# Patient Record
Sex: MALE | Race: WHITE
[De-identification: names, ages, dates, MRNs, and addresses within clinical notes are randomized per-mention and may not be internally consistent; named-entity substitution may affect disease eponyms.]

---

## 2017-01-27 ENCOUNTER — HOSPITAL ENCOUNTER (OUTPATIENT)
Dept: HOSPITAL 75 - RAD | Age: 37
End: 2017-01-27
Attending: NURSE PRACTITIONER
Payer: COMMERCIAL

## 2017-01-27 DIAGNOSIS — R05: Primary | ICD-10-CM

## 2017-01-27 PROCEDURE — 71020: CPT

## 2017-01-27 NOTE — DIAGNOSTIC IMAGING REPORT
PA and lateral chest.



INDICATION: Congestion.



There are no prior studies available for comparison.



FINDINGS: The heart size is within normal limits. There are a

few crowded bronchovascular markings in the right infrahilar

region but there is no evidence for pneumonia or for pleural

effusion. The mediastinum is not widened. The osseous structures

are intact.



IMPRESSION: There is no evidence for an acute cardiopulmonary

abnormality.



Dictated by:



Dictated on workstation # [id number]

## 2017-04-17 ENCOUNTER — HOSPITAL ENCOUNTER (OUTPATIENT)
Dept: HOSPITAL 75 - RAD | Age: 37
End: 2017-04-17
Attending: FAMILY MEDICINE
Payer: COMMERCIAL

## 2017-04-17 DIAGNOSIS — N50.89: Primary | ICD-10-CM

## 2017-04-17 PROCEDURE — 76870 US EXAM SCROTUM: CPT

## 2017-04-17 NOTE — DIAGNOSTIC IMAGING REPORT
INDICATION: Left inguinal hernia, irregular shape of the right

testicle.



COMPARISON: None.



DISCUSSION: Sonographic evaluation of the scrotum was performed.

The testicles appear normal and symmetric in echotexture and

size bilaterally with normal color and spectral Doppler blood

flow. The right testicle measures 4.8 x 2.6 x 3.4 cm. The left

testicle measures 4.9 x 2.8 x 3.1 cm. Right epididymal cyst

measures 1.8 cm and is benign though could account for palpable

abnormality. No hydrocele or varicocele. The epididymides are

unremarkable otherwise. Fat-containing left inguinal hernia is

incidentally noted.



IMPRESSION:

1. A 1.8 cm right epididymal cyst is benign though could account

for palpable abnormality.

2. Fat-containing left inguinal hernia.



Dictated by:



Dictated on workstation # [id number]

## 2017-05-30 ENCOUNTER — HOSPITAL ENCOUNTER (OUTPATIENT)
Dept: HOSPITAL 75 - PREOP | Age: 37
End: 2017-05-30
Attending: SURGERY
Payer: COMMERCIAL

## 2017-05-30 VITALS — WEIGHT: 295 LBS | HEIGHT: 71 IN | BODY MASS INDEX: 41.3 KG/M2

## 2017-05-30 DIAGNOSIS — Z01.818: Primary | ICD-10-CM

## 2017-05-30 DIAGNOSIS — K40.90: ICD-10-CM

## 2017-06-01 ENCOUNTER — HOSPITAL ENCOUNTER (OUTPATIENT)
Dept: HOSPITAL 75 - SDC | Age: 37
Discharge: HOME | End: 2017-06-01
Attending: SURGERY
Payer: COMMERCIAL

## 2017-06-01 VITALS — DIASTOLIC BLOOD PRESSURE: 85 MMHG | SYSTOLIC BLOOD PRESSURE: 141 MMHG

## 2017-06-01 VITALS — BODY MASS INDEX: 41.3 KG/M2 | HEIGHT: 71 IN | WEIGHT: 295 LBS

## 2017-06-01 VITALS — SYSTOLIC BLOOD PRESSURE: 144 MMHG | DIASTOLIC BLOOD PRESSURE: 94 MMHG

## 2017-06-01 VITALS — SYSTOLIC BLOOD PRESSURE: 133 MMHG | DIASTOLIC BLOOD PRESSURE: 83 MMHG

## 2017-06-01 VITALS — SYSTOLIC BLOOD PRESSURE: 132 MMHG | DIASTOLIC BLOOD PRESSURE: 90 MMHG

## 2017-06-01 VITALS — SYSTOLIC BLOOD PRESSURE: 141 MMHG | DIASTOLIC BLOOD PRESSURE: 85 MMHG

## 2017-06-01 DIAGNOSIS — I10: ICD-10-CM

## 2017-06-01 DIAGNOSIS — D17.6: ICD-10-CM

## 2017-06-01 DIAGNOSIS — K21.9: ICD-10-CM

## 2017-06-01 DIAGNOSIS — Z79.899: ICD-10-CM

## 2017-06-01 DIAGNOSIS — Q61.3: ICD-10-CM

## 2017-06-01 DIAGNOSIS — K40.90: Primary | ICD-10-CM

## 2017-06-01 LAB
ANION GAP SERPL CALC-SCNC: 12 MMOL/L (ref 5–14)
BUN SERPL-MCNC: 30 MG/DL (ref 7–18)
BUN/CREAT SERPL: 20
CALCIUM SERPL-MCNC: 8.9 MG/DL (ref 8.5–10.1)
CHLORIDE SERPL-SCNC: 106 MMOL/L (ref 98–107)
CO2 SERPL-SCNC: 23 MMOL/L (ref 21–32)
CREAT SERPL-MCNC: 1.52 MG/DL (ref 0.6–1.3)
ERYTHROCYTE [DISTWIDTH] IN BLOOD BY AUTOMATED COUNT: 14.2 % (ref 10–14.5)
GFR SERPLBLD BASED ON 1.73 SQ M-ARVRAT: 51 ML/MIN
GLUCOSE SERPL-MCNC: 102 MG/DL (ref 70–105)
MCH RBC QN AUTO: 30 PG (ref 25–34)
MCHC RBC AUTO-ENTMCNC: 35 G/DL (ref 32–36)
MCV RBC AUTO: 87 FL (ref 80–99)
PLATELET # BLD: 192 10^3/UL (ref 130–400)
PMV BLD AUTO: 11.2 FL (ref 7.4–10.4)
POTASSIUM SERPL-SCNC: 3.8 MMOL/L (ref 3.6–5)
RBC # BLD AUTO: 5.24 10^6/UL (ref 4.35–5.85)
SODIUM SERPL-SCNC: 141 MMOL/L (ref 135–145)
WBC # BLD AUTO: 9.2 10^3/UL (ref 4.3–11)

## 2017-06-01 PROCEDURE — 94664 DEMO&/EVAL PT USE INHALER: CPT

## 2017-06-01 PROCEDURE — 85027 COMPLETE CBC AUTOMATED: CPT

## 2017-06-01 PROCEDURE — 80048 BASIC METABOLIC PNL TOTAL CA: CPT

## 2017-06-01 PROCEDURE — 87081 CULTURE SCREEN ONLY: CPT

## 2017-06-01 PROCEDURE — 36415 COLL VENOUS BLD VENIPUNCTURE: CPT

## 2017-06-01 RX ADMIN — SODIUM CHLORIDE, SODIUM LACTATE, POTASSIUM CHLORIDE, AND CALCIUM CHLORIDE PRN MLS/HR: 600; 310; 30; 20 INJECTION, SOLUTION INTRAVENOUS at 07:50

## 2017-06-01 RX ADMIN — SODIUM CHLORIDE, SODIUM LACTATE, POTASSIUM CHLORIDE, AND CALCIUM CHLORIDE PRN MLS/HR: 600; 310; 30; 20 INJECTION, SOLUTION INTRAVENOUS at 12:15

## 2017-06-01 RX ADMIN — MORPHINE SULFATE PRN MG: 10 INJECTION, SOLUTION INTRAMUSCULAR; INTRAVENOUS at 13:07

## 2017-06-01 RX ADMIN — MORPHINE SULFATE PRN MG: 10 INJECTION, SOLUTION INTRAMUSCULAR; INTRAVENOUS at 13:00

## 2017-06-01 RX ADMIN — MORPHINE SULFATE PRN MG: 10 INJECTION, SOLUTION INTRAMUSCULAR; INTRAVENOUS at 13:20

## 2017-06-01 NOTE — PROGRESS NOTE-POST OPERATIVE
Post-Operative Progess Note


Surgeon (s)/Assistant (s)


Surgeon


ANISA HAYS MD


Assistant:  bebeto hsu APRN





Pre-Operative Diagnosis


symptomatic left inguinal hernia





Post-Operative Diagnosis





left indirect ing hernia, cord lipoma





Procedure & Operative Findings


Date of Procedure


6/1/17


Procedure Performed/Findings


laparoscopic left inguinal hernia repair with mesh.


Anesthesia Type


GET





Estimated Blood Loss


Estimated blood loss (mL):  minimal





Specimens/Packing


Specimens Removed


none











ANISA HAYS MD Jun 1, 2017 12:36 pm

## 2017-06-01 NOTE — PROGRESS NOTE-PRE OPERATIVE
Pre-Operative Progress Note


H&P Reviewed


The H&P was reviewed, patient examined and no changes noted.


Date H&P Reviewed:  Jun 1, 2017


Time H&P Reviewed:  07:45


Pre-Operative Diagnosis:  symptomatic left inguinal hernia











KATHLEEN ROMAN Jun 1, 2017 8:12 am

## 2017-06-01 NOTE — OPERATIVE REPORT
DATE OF SERVICE:  06/01/2017



PREOPERATIVE DIAGNOSIS:

Symptomatic left inguinal hernia.



POSTOPERATIVE DIAGNOSIS:

Symptomatic left indirect inguinal hernia.



PROCEDURE:

Laparoscopic left inguinal hernia repair with mesh.



SURGEON:

Anisa Hays MD 



ASSISTANT:

ELIZABETH Guillen 



ANESTHESIA:

General endotracheal.



ESTIMATED BLOOD LOSS:

Minimal.



FINDINGS:

Small indirect inguinal hernia as well as a cord lipoma.  There was no right

inguinal hernia component.



DISPOSITION:

The patient tolerated the procedure well.



INDICATIONS:

The patient is a 37-year-old male who has noticed a bulge in the left inguinal

region.  He reports that he first saw this 6 months ago and it has gradually

become larger in size.  He states that with rest and lying down that he can

reduce this back in.  Upon examination, he was found to have a reducible left

inguinal hernia which was tender to palpation.



DESCRIPTION OF PROCEDURE:

The patient was brought to the operating room, laid supine on the table.  After

adequate IV pain and sedative medications and general endotracheal intubation,

the abdomen and perineum were prepped and draped in standard surgical fashion.



Marcaine 0.5% with epinephrine was then used to anesthetize the overlying skin

in the infraumbilical rim and a small transverse skin incision made using a 15

blade.  The abdominal wall was then retracted anteriorly with a sharp towel

clamp and a Veress needle inserted with a low opening pressure of 0 mmHg and the

abdomen was insufflated to 15 mmHg pressure.  The Veress needle removed and a 5

mm Xcel trocar placed followed by a 5 mm 45 degree angle laparoscope,

visualizing the peritoneal cavity.



A 4-quadrant abdominal exploration was performed.  There was a small left

indirect inguinal hernia identified.  There was, upon further exploration, a

left cord lipoma as well.  There was no right inguinal hernia component.  Under

direct visualization, we then proceeded to place bilateral 5 mm ports after the

skin and peritoneum were anesthetized using 0.5% Marcaine with epinephrine and a

skin incision made using a 15 blade.



The patient was then placed in Trendelenburg position.  The peritoneal lining

was then opened starting laterally using a Sonicision towards the conjoint

tendon and inguinal ligament laterally.  We then proceeded medially toward

Vincent's ligament.  We then proceeded with inferior dissection encompassing the

hernia sac as well as the entire cord lipoma.  The cord and its adjacent

structures were identified and spared throughout the process.  Good hemostasis

was observed.  A medium sized Bard polypropylene mesh was then placed through

the 10 mm port site.  The mesh was then tacked to Vincent's ligament medially

with a secure strap and then laterally to the conjoint tendon and inguinal

ligament.  The omentum was then placed entirely back over the mesh and a few

tacks placed to hold this into place with visualization and good hemostasis.



The 10 mm port site fascia and peritoneum were then closed under direct

visualization using a Timothy-Marilyn device and 0 Vicryl suture.  The abdomen

was desufflated and the remaining ports removed.  All skin incisions were closed

using 4-0 Monocryl running subcuticular sutures.  Wounds were then cleaned and

covered with Dermabond.



The patient tolerated the procedure well.  We will start IV and oral pain

medication as well as a clear liquid diet.  Once he is tolerating clears, has

good pain control with oral pain medications and ambulating well, we will

discharge him home.  He will be instructed to wear scrotal support at all times 

as well as do no heavy lifting or exertion for the next 2 weeks and then

increase activity; however, continue to refrain from heavy lifting and exertion

until 6 weeks from the surgery date.





Job ID: [id number]

DocumentID: [id number]

Dictated Date:  06/01/2017 12:35:47

Transcription Date: 06/01/2017 13:29:06

Dictated By: ANISA HAYS MD

## 2017-06-01 NOTE — DISCHARGE INST-SURGICAL
D/C Lap Instructions-LIS


New, Converted, or Re-Newed RX:  RX on Chart


Follow Up Appt in 2 weeks





Activity as tolerated


No driving for 24 hours


No driving while on pain medications





Incentive Spirometry use every 2 hours while awake





Regular Diet





Symptoms to Report: Fever over 101 degree F, Nausea/Vomiting 


Infection Signs and Symptoms to report:  Increased redness, Foul odor of wound, 

Increased drainage





Bathing instructions: May shower


Operative Area Clean/Dry;  Keep incision clean/dry





If any problems/questions: Contact your physician or go to Emergency Room











ANISA HAYS MD Jun 1, 2017 12:39 pm

## 2020-05-06 ENCOUNTER — HOSPITAL ENCOUNTER (OUTPATIENT)
Dept: HOSPITAL 75 - RAD | Age: 40
End: 2020-05-06
Attending: INTERNAL MEDICINE
Payer: COMMERCIAL

## 2020-05-06 DIAGNOSIS — Q61.2: ICD-10-CM

## 2020-05-06 DIAGNOSIS — R31.29: ICD-10-CM

## 2020-05-06 DIAGNOSIS — E55.9: ICD-10-CM

## 2020-05-06 DIAGNOSIS — E79.0: ICD-10-CM

## 2020-05-06 DIAGNOSIS — I12.9: Primary | ICD-10-CM

## 2020-05-06 DIAGNOSIS — N18.3: ICD-10-CM

## 2020-05-06 DIAGNOSIS — E21.1: ICD-10-CM

## 2020-05-06 PROCEDURE — 76770 US EXAM ABDO BACK WALL COMP: CPT

## 2020-05-06 NOTE — DIAGNOSTIC IMAGING REPORT
PROCEDURE: 

US Renal Bilateral.



TECHNIQUE: 

Multiple Real-time grayscale images were obtained over the

kidneys in various projections bilaterally.



INDICATION: 

Chronic kidney disease.



FINDINGS: 

Both kidneys are markedly enlarged due to autosomal dominant

polycystic kidney disease. The right kidney measures up to 26 x

14 x 19 cm and the left kidney measures 19 x 12 x 14 cm. There is

no hydronephrosis or calculus. There is no obvious solid mass.

Both ureteral jets were visualized.



IMPRESSION: 

Severe autosomal dominant polycystic kidney disease without

evidence of solid mass or hydronephrosis.



Dictated by: 



  Dictated on workstation # [id number]

## 2020-08-31 ENCOUNTER — HOSPITAL ENCOUNTER (OUTPATIENT)
Dept: HOSPITAL 75 - PREOP | Age: 40
Discharge: HOME | End: 2020-08-31
Attending: SURGERY
Payer: COMMERCIAL

## 2020-08-31 VITALS — BODY MASS INDEX: 43.92 KG/M2 | WEIGHT: 313.72 LBS | HEIGHT: 70.98 IN

## 2020-08-31 DIAGNOSIS — Z01.818: Primary | ICD-10-CM

## 2020-09-03 ENCOUNTER — HOSPITAL ENCOUNTER (OUTPATIENT)
Dept: HOSPITAL 75 - SDC | Age: 40
Discharge: HOME | End: 2020-09-03
Attending: SURGERY
Payer: COMMERCIAL

## 2020-09-03 VITALS — SYSTOLIC BLOOD PRESSURE: 134 MMHG | DIASTOLIC BLOOD PRESSURE: 88 MMHG

## 2020-09-03 VITALS — SYSTOLIC BLOOD PRESSURE: 135 MMHG | DIASTOLIC BLOOD PRESSURE: 89 MMHG

## 2020-09-03 VITALS — SYSTOLIC BLOOD PRESSURE: 127 MMHG | DIASTOLIC BLOOD PRESSURE: 83 MMHG

## 2020-09-03 VITALS — DIASTOLIC BLOOD PRESSURE: 76 MMHG | SYSTOLIC BLOOD PRESSURE: 119 MMHG

## 2020-09-03 VITALS — DIASTOLIC BLOOD PRESSURE: 80 MMHG | SYSTOLIC BLOOD PRESSURE: 126 MMHG

## 2020-09-03 VITALS — HEIGHT: 70.98 IN | WEIGHT: 313.72 LBS | BODY MASS INDEX: 43.92 KG/M2

## 2020-09-03 VITALS — SYSTOLIC BLOOD PRESSURE: 131 MMHG | DIASTOLIC BLOOD PRESSURE: 85 MMHG

## 2020-09-03 VITALS — SYSTOLIC BLOOD PRESSURE: 122 MMHG | DIASTOLIC BLOOD PRESSURE: 78 MMHG

## 2020-09-03 VITALS — DIASTOLIC BLOOD PRESSURE: 96 MMHG | SYSTOLIC BLOOD PRESSURE: 134 MMHG

## 2020-09-03 VITALS — DIASTOLIC BLOOD PRESSURE: 77 MMHG | SYSTOLIC BLOOD PRESSURE: 121 MMHG

## 2020-09-03 DIAGNOSIS — M10.9: ICD-10-CM

## 2020-09-03 DIAGNOSIS — Z79.899: ICD-10-CM

## 2020-09-03 DIAGNOSIS — K42.9: Primary | ICD-10-CM

## 2020-09-03 DIAGNOSIS — K21.9: ICD-10-CM

## 2020-09-03 DIAGNOSIS — Z88.2: ICD-10-CM

## 2020-09-03 DIAGNOSIS — Q61.3: ICD-10-CM

## 2020-09-03 DIAGNOSIS — I10: ICD-10-CM

## 2020-09-03 DIAGNOSIS — Z11.2: ICD-10-CM

## 2020-09-03 DIAGNOSIS — G47.33: ICD-10-CM

## 2020-09-03 PROCEDURE — 87081 CULTURE SCREEN ONLY: CPT

## 2020-09-03 PROCEDURE — 94664 DEMO&/EVAL PT USE INHALER: CPT

## 2020-09-03 RX ADMIN — SODIUM CHLORIDE, SODIUM LACTATE, POTASSIUM CHLORIDE, AND CALCIUM CHLORIDE PRN MLS/HR: 600; 310; 30; 20 INJECTION, SOLUTION INTRAVENOUS at 08:35

## 2020-09-03 RX ADMIN — SODIUM CHLORIDE, SODIUM LACTATE, POTASSIUM CHLORIDE, AND CALCIUM CHLORIDE PRN MLS/HR: 600; 310; 30; 20 INJECTION, SOLUTION INTRAVENOUS at 10:33

## 2020-09-03 NOTE — ANESTHESIA-GENERAL POST-OP
General


Patient Condition


Mental Status/LOC:  Same as Preop


Cardiovascular:  Satisfactory


Nausea/Vomiting:  Absent


Respiratory:  Satisfactory


Pain:  Controlled


Complications:  Absent





Post Op Complications


Complications


None





Follow Up Care/Instructions


Patient Instructions


None needed.





Anesthesia/Patient Condition


Patient Condition


Patient is doing well, no complaints, stable vital signs, no apparent adverse 

anesthesia problems.   


No complications reported per nursing.











LESLIE DEGROOT CRNA               Sep 3, 2020 11:57

## 2020-09-03 NOTE — OPERATIVE REPORT
DATE OF SERVICE:  09/03/2020



ATTENDING PRIMARY CARE PHYSICIAN:

Dr. Jame Emmanuel.



PREOPERATIVE DIAGNOSIS:

Reducible umbilical hernia.



POSTOPERATIVE DIAGNOSIS:

Reducible umbilical hernia.



PROCEDURE PERFORMED:

Open umbilical hernia repair with mesh.



SURGEON:

Marvin Hays MD.



ASSISTANT:

ELIZABETH Guillen.



ANESTHESIA:

General endotracheal.



ESTIMATED BLOOD LOSS:

Minimal.



FINDINGS:

A fascial defect approximately 2 cm in size.



DISPOSITION:

The patient tolerated the procedure well.



INDICATIONS FOR PROCEDURE:

The patient is a 40-year-old male known to us.  He was seen in 05/2007 for a

pain and a bulge in the left inguinal region.  In 2017, he was found to have a

left inguinal hernia.  He underwent a laparoscopic left inguinal hernia repair

with mesh on 06/01/2017.  He reported a discomfort in the umbilical region just

superior.  He noticed this approximately a year ago and this was initially mild;

however, has grown larger in size and become more painful and he was seen in the

office and found to have a reducible umbilical hernia, which was painful to

palpation.



DESCRIPTION OF PROCEDURE:

The patient was brought to the operating room and laid supine on the table. 

After adequate IV pain and sedative medications and general endotracheal

intubation, the abdomen was prepped and draped in a standard surgical fashion.



A 0.5% Marcaine with epinephrine was used to anesthetize the overlying skin in

the supraumbilical rim and a crescent shaped skin incision was made using a 15

blade.  Subcutaneous tissue was then dissected using electrocautery.  The hernia

sac was identified and completely dissected out using a blunt dissection as well

as electrocautery to the fascial base.  A 6.4 cm round, coated polypropylene

mesh was then placed in the defect and sutured transfascially to the mesh using

interrupted 0 Prolene sutures.  Good hemostasis was observed.  Subcutaneous

tissue was then reapproximated using 3-0 Vicryl interrupted sutures and skin was

closed using 4-0 Monocryl running subcuticular suture.  Wound was then cleaned

and covered with Dermabond.  Wound was then packed with tonsil sponges followed

by 4 x 4 gauze followed by large Op-Site and abdominal binder.



The patient tolerated the procedure well.  We will start IV normal pain

medication as well as a clear liquid diet.  Once he is tolerating clears, has

good pain control with oral pain medications and ambulating well, we will

discharge him home.  He will be instructed to do no heavy lifting or exertion

for the next two weeks.





Job ID: [id number]

DocumentID: [id number]

Dictated Date:  09/03/2020 10:43:39

Transcription Date: 09/03/2020 13:04:24

Dictated By: MARVIN HAYS MD

## 2020-09-03 NOTE — PROGRESS NOTE-PRE OPERATIVE
Pre-Operative Progress Note


H&P Reviewed


The H&P was reviewed, patient examined and no changes noted.


Date Seen by Provider:  Sep 3, 2020


Time Seen by Provider:  08:45


Date H&P Reviewed:  Sep 3, 2020


Time H&P Reviewed:  08:40


Pre-Operative Diagnosis:  Symptomatic umbilical hernia











KATHLEEN ROMAN           Sep 3, 2020 08:46

## 2020-09-03 NOTE — PROGRESS NOTE-POST OPERATIVE
Post-Operative Progess Note


Surgeon (s)/Assistant (s)


Surgeon


ANISA HAYS MD


Assistant:  bebeto hsu APRN





Pre-Operative Diagnosis


Symptomatic umbilical hernia





Post-Operative Diagnosis





same(2cm)





Procedure & Operative Findings


Date of Procedure


9/3/20


Procedure Performed/Findings


open umbilical hernia repair with mesh.


Anesthesia Type


get





Estimated Blood Loss


Estimated blood loss (mL):  minimal





Specimens/Packing


Specimens Removed


none











ANISA HAYS MD                 Sep 3, 2020 10:49

## 2020-09-03 NOTE — DISCHARGE INST-SURGICAL
D/C Lap Instructions-KIDO


Reconcile Patient Problems


Problems Reviewed?:  Yes


New, Converted, or Re-Newed RX:  RX on Chart


Follow Up Appt in 2 weeks





Activity as tolerated


No driving for 24 hours


No driving while on pain medications





Incentive Spirometry use every 2 hours while awake





Regular Diet





Symptoms to Report: Fever over 101 degree F, Nausea/Vomiting 


Infection Signs and Symptoms to report:  Increased redness, Foul odor of wound, 

Increased drainage





Bathing instructions: May shower


Operative Area Clean/Dry;  Keep incision clean/dry





If any problems/questions: Contact your physician or go to Emergency Room











KATHLEEN ROMAN           Sep 3, 2020 08:53

## 2022-04-04 ENCOUNTER — HOSPITAL ENCOUNTER (OUTPATIENT)
Dept: HOSPITAL 75 - RAD | Age: 42
End: 2022-04-04
Attending: INTERNAL MEDICINE
Payer: COMMERCIAL

## 2022-04-04 DIAGNOSIS — N28.1: Primary | ICD-10-CM

## 2022-04-04 DIAGNOSIS — E55.9: ICD-10-CM

## 2022-04-04 DIAGNOSIS — I12.9: ICD-10-CM

## 2022-04-04 DIAGNOSIS — N18.4: ICD-10-CM

## 2022-04-04 DIAGNOSIS — Q61.2: ICD-10-CM

## 2022-04-04 DIAGNOSIS — E21.1: ICD-10-CM

## 2022-04-04 DIAGNOSIS — E79.0: ICD-10-CM

## 2022-04-04 PROCEDURE — 76770 US EXAM ABDO BACK WALL COMP: CPT

## 2022-04-04 NOTE — DIAGNOSTIC IMAGING REPORT
INDICATION: Chronic renal disease, stage IV.



TECHNIQUE: Multiple real-time grayscale sonographic images were

obtained of the kidneys.



CORRELATION: 05/06/2020.



FINDINGS:

RIGHT KIDNEY: Approximately 22.7 x 14.4 x 19.7 cm.  

LEFT KIDNEY: Approximately 27.1 x 11.7 x 17.6 cm. 

The kidneys are ill defined. There is extensive replacement of

large portion of the renal parenchyma of both kidneys with

multiple cysts. This is consistent with polycystic kidney

disease. Normal-appearing renal parenchyma is difficult to

discern. Very little if any normal renal parenchyma is suggested.

No overt hydronephrosis.



URINARY BLADDER:  

The partially distended bladder has an unremarkable appearance.  

The ureteral jets are not visualized.





IMPRESSION:

1. Extensive replacement of the renal parenchyma with cysts of

various sizes consistent with polycystic kidney disease. Very

little normal-appearing renal parenchyma is suggested.



Dictated by: 



  Dictated on workstation # [id number]

## 2023-03-22 ENCOUNTER — HOSPITAL ENCOUNTER (OUTPATIENT)
Dept: HOSPITAL 75 - RAD | Age: 43
End: 2023-03-22
Attending: FAMILY MEDICINE
Payer: COMMERCIAL

## 2023-03-22 DIAGNOSIS — Q61.3: Primary | ICD-10-CM

## 2023-03-22 PROCEDURE — 76705 ECHO EXAM OF ABDOMEN: CPT

## 2023-03-22 NOTE — DIAGNOSTIC IMAGING REPORT
PROCEDURE: US Abdomen, limited.



TECHNIQUE: Multiple realtime grayscale images were obtained over

the abdomen in various projections.



INDICATION: Epigastric protrusion.



Ultrasound of the epigastric abdominal wall does not show any

evidence of hernia. Valsalva maneuver was performed. There are no

defects seen.



IMPRESSION: No sonographic evidence for abdominal hernia.



Dictated by: 



  Dictated on workstation # [id number]

## 2023-09-25 ENCOUNTER — HOSPITAL ENCOUNTER (OUTPATIENT)
Dept: HOSPITAL 75 - RAD | Age: 43
End: 2023-09-25
Attending: INTERNAL MEDICINE
Payer: COMMERCIAL

## 2023-09-25 DIAGNOSIS — N18.4: Primary | ICD-10-CM

## 2023-09-25 PROCEDURE — 76770 US EXAM ABDO BACK WALL COMP: CPT

## 2023-09-25 NOTE — DIAGNOSTIC IMAGING REPORT
PROCEDURE: 

US Renal Bilateral.



TECHNIQUE: 

Multiple Real-time grayscale images were obtained over the

kidneys in various projections bilaterally.



INDICATION: 

Chronic kidney disease, stage IV, and polycystic kidney disease.



COMPARISON: 

Correlation is made with the prior renal ultrasound from

04/04/2022.



FINDINGS:

Both kidneys are enlarged with the right kidney measuring 20.5 x

15.0 x 18.6 cm and the left kidney measuring 23.0 x 12.1 x 17.0

cm. Both kidneys again are replaced by innumerable cysts,

consistent with polycystic kidney disease. No definite

discernible renal parenchyma is identified. No solid renal mass

is detected. No definite calculi are seen. The bladder volume is

694 mL with a postvoid volume of 65 mL. The ureteral jets were

visualized.



IMPRESSION: 

Findings remain consistent with autosomal dominant polycystic

kidney disease, similar to prior exam. No definite solid renal

mass is detected.



Dictated by: 



  Dictated on workstation # [id number]